# Patient Record
(demographics unavailable — no encounter records)

---

## 2024-12-18 NOTE — HEALTH RISK ASSESSMENT
[0] : 2) Feeling down, depressed, or hopeless: Not at all (0) [PHQ-2 Negative - No further assessment needed] : PHQ-2 Negative - No further assessment needed [Patient reported mammogram was normal] : Patient reported mammogram was normal [Patient reported PAP Smear was normal] : Patient reported PAP Smear was normal [Patient reported bone density results were normal] : Patient reported bone density results were normal [Patient reported colonoscopy was normal] : Patient reported colonoscopy was normal [Good] : ~his/her~  mood as  good [No] : In the past 12 months have you used drugs other than those required for medical reasons? No [No falls in past year] : Patient reported no falls in the past year [Fully functional (bathing, dressing, toileting, transferring, walking, feeding)] : Fully functional (bathing, dressing, toileting, transferring, walking, feeding) [Fully functional (using the telephone, shopping, preparing meals, housekeeping, doing laundry, using] : Fully functional and needs no help or supervision to perform IADLs (using the telephone, shopping, preparing meals, housekeeping, doing laundry, using transportation, managing medications and managing finances) [Current] : Current [20 or more] : 20 or more [BOG8Apatv] : 0 [No Retinopathy] : No retinopathy [EyeExamDate] : 2024 [Change in mental status noted] : No change in mental status noted [Reports changes in hearing] : Reports no changes in hearing [Reports changes in vision] : Reports no changes in vision [MammogramDate] : 2024 [PapSmearDate] : 2024 [BoneDensityDate] : 2024 [ColonoscopyDate] : 2024

## 2024-12-18 NOTE — HEALTH RISK ASSESSMENT
[0] : 2) Feeling down, depressed, or hopeless: Not at all (0) [PHQ-2 Negative - No further assessment needed] : PHQ-2 Negative - No further assessment needed [Patient reported mammogram was normal] : Patient reported mammogram was normal [Patient reported PAP Smear was normal] : Patient reported PAP Smear was normal [Patient reported bone density results were normal] : Patient reported bone density results were normal [Patient reported colonoscopy was normal] : Patient reported colonoscopy was normal [Good] : ~his/her~  mood as  good [No] : In the past 12 months have you used drugs other than those required for medical reasons? No [No falls in past year] : Patient reported no falls in the past year [Fully functional (bathing, dressing, toileting, transferring, walking, feeding)] : Fully functional (bathing, dressing, toileting, transferring, walking, feeding) [Fully functional (using the telephone, shopping, preparing meals, housekeeping, doing laundry, using] : Fully functional and needs no help or supervision to perform IADLs (using the telephone, shopping, preparing meals, housekeeping, doing laundry, using transportation, managing medications and managing finances) [Current] : Current [20 or more] : 20 or more [ENE4Byqiq] : 0 [No Retinopathy] : No retinopathy [EyeExamDate] : 2024 [Change in mental status noted] : No change in mental status noted [Reports changes in hearing] : Reports no changes in hearing [Reports changes in vision] : Reports no changes in vision [MammogramDate] : 2024 [PapSmearDate] : 2024 [BoneDensityDate] : 2024 [ColonoscopyDate] : 2024

## 2024-12-18 NOTE — ASSESSMENT
[FreeTextEntry1] : //  advise to sign medical release to have all records faxed over to review  Abnormal ecg, no chest pain or SOBE.  No prior EKG for comparison does not recall seeing cardiologist in the past -cardio referral   Diabetes Mellitus, wearing freestyle marilyn, no BG to review as just placed today  -continue metformin -Gave patient education on what foods to eat and avoid, as well as exercises to incorporate into their daily routine. -Encourage healthy Mediterranean Diet and moderate exercise regularly -May visit ADA (American Diabetes Association) website for diet recommendations -Advised to check a fasting sugar level in the morning, 2 hours after ANY meal and prior to bedtime.  -Record levels and bring it to your next appointment along with glucometer to review When to call your Doctor: -Call your doctor if you have unexplained blood sugar of 200 mg/dl or greater for 2 days.  -Call your doctor, if your blood sugar is higher than 300 mg/dl at any time. -Call your doctor, if your blood sugar is less than 70 mg/dl two times on the same day or three times within one week. Your doctor may want to adjust your medication.  -If you have signs/symptoms of low blood sugar (hypoglycemia),and/or your blood sugar is less than 70 mg/dl you may choose one of the below treatment 3 glucose tablets or  glass (4 oz.) of apple juice or 1/2 glass (4 oz.) of clear regular soda  -Advised to follow-up with Ophthalmologist annually for diabetic retinopathy screening   Hyperthyroidism -check levels  -continue methimazole for now  -endo referral   Hypertension, labile  -continue amlodipine as directed -educated that increased blood pressure is linked to but limited to increase risk of heart disease, stroke, kidney failure and even death. stressed the importance to lower values by complying to a less than 2g sodium diet, moderate cardiovascular exercise and decrease stress level as recommended by the SPRINT Trial -advised to check blood pressure at home with blood pressure cuff at different times of the day and bring to next appt, to eliminate possibility of white coat syndrome.  Nicotine Dependence, 1PPD >20 years, less now  -Have CT chest results faxed over to review.  Did discuss yearly LDCT for lung cancer screening -discussed multiple options to quit smoking such as nicotine replacements, medications and group therapy, advised to pick a quit date and strive to achieve complete cessation, informational phone numbers, programs and educational materiel given to patient, stressed importance to quit as to reduce risk of cancer, cardiovascular event and premature death.   -Patient is a smoker, smoking cessation was strongly encouraged.  Patient was advised that smoking cessation lowers risks for lung and other types of cancer, reduces the risk of coronary heart disease, stroke and peripheral vascular disease and reduces the risk of developing chronic obstructive pulmonary disease (COPD).  Overall, smoking can cause serious health issues and death.   -It is important that you stop smoking.  Advised to pick a quit date and adhere to therapy.  If you need help to quit smoking call the [facility] Smokers Quitline at 1-972.245.1720 for additional information and nicotine replacement therapy   time spent on education 5 minutes.  Obesity  -Being overweight increases your risk of health conditions such as heart disease, high blood pressure, type 2 diabetes, and certain types of cancer. It can also increase your risk for osteoarthritis, sleep apnea, and other respiratory problems. Aim for a slow, steady weight loss. Even a small amount of weight loss can lower your risk of health problems. -A safe and healthy way to lose weight is to eat fewer calories and get regular exercise. You can lose up about 1 pound a week by decreasing the number of calories you eat by 500 calories each day. You can decrease calories by eating smaller portion sizes or by cutting out high-calorie foods. Read labels to find out how many calories are in the foods you eat. You can also burn calories with exercise such as walking, swimming, or biking. You will be more likely to keep weight off if you make these changes part of your lifestyle. -Exercise at least 30 minutes per day on most days of the week. Some examples of exercise include walking, biking, dancing, and swimming. You can also fit in more physical activity by taking the stairs instead of the elevator or parking farther away from stores.  Healthcare Maintenance -Advise Yearly Skin cancer screening with Dermatologist  -Advise Yearly Eye exam with Ophthalmologist -Advise Yearly Dental exam -Educated of the importance of Healthy diet, such as Mediterranean Diet and Exercise, such as walking >20 minutes a day and increasing gradually as tolerated  Immunizations -Flu vaccine -up to date  -Covid vaccine  -Pneumonia vaccine -given today  -Discussed shingles vaccine (shingrix), advised to verify with insurance if its covered through medical or prescription plan  Preventative screening  -advised to get PAP for cervical cancer screening -up to date  -advised to get mammogram for breast cancer screening  -up to date  -advised to get bone density for osteoporosis screening -up to date  -advised to get colonoscopy for colon cancer screening -up to date   f/u in 2 months, sooner if needed

## 2024-12-18 NOTE — HISTORY OF PRESENT ILLNESS
[de-identified] : 65 year old female with history of diabetes, hypertension, obesity, hyperthyroidism, presents for initial annual exam.  Recently moved to the area and establishing care.  Was following up with Dr. Betancourt in [place].  States no longer wants to see her as she is the one who put her on the losartan.   Recently discharged from hospital last month due to SONALI on following initiation of losartan.   Compliant with meds, no side effects.   History of hyperthyroidism.  Has not followed up with endocrinologist since hospitalization. +smoker, 1-3 cigs per day, Does recall seeing specialist, Dr. Rees? and getting CT chest earlier this year.  Single, not sexually active lives at home alone

## 2024-12-18 NOTE — ASSESSMENT
[FreeTextEntry1] : //  advise to sign medical release to have all records faxed over to review  Abnormal ecg, no chest pain or SOBE.  No prior EKG for comparison does not recall seeing cardiologist in the past -cardio referral   Diabetes Mellitus, wearing freestyle marilyn, no BG to review as just placed today  -continue metformin -Gave patient education on what foods to eat and avoid, as well as exercises to incorporate into their daily routine. -Encourage healthy Mediterranean Diet and moderate exercise regularly -May visit ADA (American Diabetes Association) website for diet recommendations -Advised to check a fasting sugar level in the morning, 2 hours after ANY meal and prior to bedtime.  -Record levels and bring it to your next appointment along with glucometer to review When to call your Doctor: -Call your doctor if you have unexplained blood sugar of 200 mg/dl or greater for 2 days.  -Call your doctor, if your blood sugar is higher than 300 mg/dl at any time. -Call your doctor, if your blood sugar is less than 70 mg/dl two times on the same day or three times within one week. Your doctor may want to adjust your medication.  -If you have signs/symptoms of low blood sugar (hypoglycemia),and/or your blood sugar is less than 70 mg/dl you may choose one of the below treatment 3 glucose tablets or  glass (4 oz.) of apple juice or 1/2 glass (4 oz.) of clear regular soda  -Advised to follow-up with Ophthalmologist annually for diabetic retinopathy screening   Hyperthyroidism -check levels  -continue methimazole for now  -endo referral   Hypertension, labile  -continue amlodipine as directed -educated that increased blood pressure is linked to but limited to increase risk of heart disease, stroke, kidney failure and even death. stressed the importance to lower values by complying to a less than 2g sodium diet, moderate cardiovascular exercise and decrease stress level as recommended by the SPRINT Trial -advised to check blood pressure at home with blood pressure cuff at different times of the day and bring to next appt, to eliminate possibility of white coat syndrome.  Nicotine Dependence, 1PPD >20 years, less now  -Have CT chest results faxed over to review.  Did discuss yearly LDCT for lung cancer screening -discussed multiple options to quit smoking such as nicotine replacements, medications and group therapy, advised to pick a quit date and strive to achieve complete cessation, informational phone numbers, programs and educational materiel given to patient, stressed importance to quit as to reduce risk of cancer, cardiovascular event and premature death.   -Patient is a smoker, smoking cessation was strongly encouraged.  Patient was advised that smoking cessation lowers risks for lung and other types of cancer, reduces the risk of coronary heart disease, stroke and peripheral vascular disease and reduces the risk of developing chronic obstructive pulmonary disease (COPD).  Overall, smoking can cause serious health issues and death.   -It is important that you stop smoking.  Advised to pick a quit date and adhere to therapy.  If you need help to quit smoking call the [facility] Smokers Quitline at 1-820.871.8016 for additional information and nicotine replacement therapy   time spent on education 5 minutes.  Obesity  -Being overweight increases your risk of health conditions such as heart disease, high blood pressure, type 2 diabetes, and certain types of cancer. It can also increase your risk for osteoarthritis, sleep apnea, and other respiratory problems. Aim for a slow, steady weight loss. Even a small amount of weight loss can lower your risk of health problems. -A safe and healthy way to lose weight is to eat fewer calories and get regular exercise. You can lose up about 1 pound a week by decreasing the number of calories you eat by 500 calories each day. You can decrease calories by eating smaller portion sizes or by cutting out high-calorie foods. Read labels to find out how many calories are in the foods you eat. You can also burn calories with exercise such as walking, swimming, or biking. You will be more likely to keep weight off if you make these changes part of your lifestyle. -Exercise at least 30 minutes per day on most days of the week. Some examples of exercise include walking, biking, dancing, and swimming. You can also fit in more physical activity by taking the stairs instead of the elevator or parking farther away from stores.  Healthcare Maintenance -Advise Yearly Skin cancer screening with Dermatologist  -Advise Yearly Eye exam with Ophthalmologist -Advise Yearly Dental exam -Educated of the importance of Healthy diet, such as Mediterranean Diet and Exercise, such as walking >20 minutes a day and increasing gradually as tolerated  Immunizations -Flu vaccine -up to date  -Covid vaccine  -Pneumonia vaccine -given today  -Discussed shingles vaccine (shingrix), advised to verify with insurance if its covered through medical or prescription plan  Preventative screening  -advised to get PAP for cervical cancer screening -up to date  -advised to get mammogram for breast cancer screening  -up to date  -advised to get bone density for osteoporosis screening -up to date  -advised to get colonoscopy for colon cancer screening -up to date   f/u in 2 months, sooner if needed

## 2025-03-17 NOTE — ASSESSMENT
[FreeTextEntry1] : //  advise to sign medical release to have all records faxed over to review- still never recevied   Abnormal ecg, no chest pain or SOBE.  No prior EKG for comparison does not recall seeing cardiologist in the past -cardio referral -still needs to see  Diabetes Mellitus, controlled  -continue metformin -Gave patient education on what foods to eat and avoid, as well as exercises to incorporate into their daily routine. -Encourage healthy Mediterranean Diet and moderate exercise regularly -May visit ADA (American Diabetes Association) website for diet recommendations -Advised to check a fasting sugar level in the morning, 2 hours after ANY meal and prior to bedtime.  -Record levels and bring it to your next appointment along with glucometer to review When to call your Doctor: -Call your doctor if you have unexplained blood sugar of 200 mg/dl or greater for 2 days.  -Call your doctor, if your blood sugar is higher than 300 mg/dl at any time. -Call your doctor, if your blood sugar is less than 70 mg/dl two times on the same day or three times within one week. Your doctor may want to adjust your medication.  -If you have signs/symptoms of low blood sugar (hypoglycemia),and/or your blood sugar is less than 70 mg/dl you may choose one of the below treatment 3 glucose tablets or  glass (4 oz.) of apple juice or 1/2 glass (4 oz.) of clear regular soda  -Advised to follow-up with Ophthalmologist annually for diabetic retinopathy screening   Hyperthyroidism -check levels  -continue methimazole for now  -endo referral -still needs to see   Hypertension, labile  -continue amlodipine as directed -educated that increased blood pressure is linked to but limited to increase risk of heart disease, stroke, kidney failure and even death. stressed the importance to lower values by complying to a less than 2g sodium diet, moderate cardiovascular exercise and decrease stress level as recommended by the SPRINT Trial -advised to check blood pressure at home with blood pressure cuff at different times of the day and bring to next appt, to eliminate possibility of white coat syndrome.  Nicotine Dependence, 1PPD >20 years, quit smoking in 11/2024. -Have CT chest results faxed over to review.  Did discuss yearly LDCT for lung cancer screening -discussed multiple options to quit smoking such as nicotine replacements, medications and group therapy, advised to pick a quit date and strive to achieve complete cessation, informational phone numbers, programs and educational materiel given to patient, stressed importance to quit as to reduce risk of cancer, cardiovascular event and premature death.   -Patient is a smoker, smoking cessation was strongly encouraged.  Patient was advised that smoking cessation lowers risks for lung and other types of cancer, reduces the risk of coronary heart disease, stroke and peripheral vascular disease and reduces the risk of developing chronic obstructive pulmonary disease (COPD).  Overall, smoking can cause serious health issues and death.   -It is important that you stop smoking.  Advised to pick a quit date and adhere to therapy.  If you need help to quit smoking call the [facility] Smokers Quitline at 1-896.140.6410 for additional information and nicotine replacement therapy   time spent on education 5 minutes.  Obesity  -Being overweight increases your risk of health conditions such as heart disease, high blood pressure, type 2 diabetes, and certain types of cancer. It can also increase your risk for osteoarthritis, sleep apnea, and other respiratory problems. Aim for a slow, steady weight loss. Even a small amount of weight loss can lower your risk of health problems. -A safe and healthy way to lose weight is to eat fewer calories and get regular exercise. You can lose up about 1 pound a week by decreasing the number of calories you eat by 500 calories each day. You can decrease calories by eating smaller portion sizes or by cutting out high-calorie foods. Read labels to find out how many calories are in the foods you eat. You can also burn calories with exercise such as walking, swimming, or biking. You will be more likely to keep weight off if you make these changes part of your lifestyle. -Exercise at least 30 minutes per day on most days of the week. Some examples of exercise include walking, biking, dancing, and swimming. You can also fit in more physical activity by taking the stairs instead of the elevator or parking farther away from stores.  Immunizations -Flu vaccine -up to date  -Covid vaccine  -Pneumonia vaccine -up to date  -Discussed shingles vaccine (shingrix), advised to verify with insurance if its covered through medical or prescription plan  Preventative screening  -advised to get PAP for cervical cancer screening -up to date  -advised to get mammogram for breast cancer screening  -up to date  -advised to get bone density for osteoporosis screening -up to date  -advised to get colonoscopy for colon cancer screening -up to date   f/u in 3 months, sooner if needed

## 2025-03-17 NOTE — HISTORY OF PRESENT ILLNESS
[de-identified] : 66 year old female with history of diabetes, hypertension, obesity, hyperthyroidism, presents for followup.    still needs to see endocrinologist for hyperthyroidism.   Was having car trouble in the middle of moving but states at more stable place and will reach out to schedule an appointment History of hyperthyroidism.  Has not followed up with endocrinologist since hospitalization. quit smoking in 11/2024.  Single, not sexually active lives at home alone  former smoker, quit smoking in 11/2024.  No.

## 2025-06-18 NOTE — ASSESSMENT
[FreeTextEntry1] : //  advise to sign medical release to have all records faxed over to review- still never recevied   Abnormal ecg, no chest pain or SOBE.  No prior EKG for comparison does not recall seeing cardiologist in the past -cardio referral -still needs to see  Diabetes Mellitus, controlled  -continue metformin -Gave patient education on what foods to eat and avoid, as well as exercises to incorporate into their daily routine. -Encourage healthy Mediterranean Diet and moderate exercise regularly -May visit ADA (American Diabetes Association) website for diet recommendations -Advised to check a fasting sugar level in the morning, 2 hours after ANY meal and prior to bedtime.  -Record levels and bring it to your next appointment along with glucometer to review When to call your Doctor: -Call your doctor if you have unexplained blood sugar of 200 mg/dl or greater for 2 days.  -Call your doctor, if your blood sugar is higher than 300 mg/dl at any time. -Call your doctor, if your blood sugar is less than 70 mg/dl two times on the same day or three times within one week. Your doctor may want to adjust your medication.  -If you have signs/symptoms of low blood sugar (hypoglycemia),and/or your blood sugar is less than 70 mg/dl you may choose one of the below treatment 3 glucose tablets or  glass (4 oz.) of apple juice or 1/2 glass (4 oz.) of clear regular soda  -Advised to follow-up with Ophthalmologist annually for diabetic retinopathy screening   Hyperthyroidism -check levels  -continue methimazole for now  -endo referral -still needs to see   Hypertension, labile  -continue amlodipine as directed -educated that increased blood pressure is linked to but limited to increase risk of heart disease, stroke, kidney failure and even death. stressed the importance to lower values by complying to a less than 2g sodium diet, moderate cardiovascular exercise and decrease stress level as recommended by the SPRINT Trial -advised to check blood pressure at home with blood pressure cuff at different times of the day and bring to next appt, to eliminate possibility of white coat syndrome.  Nicotine Dependence, 1PPD >20 years, quit smoking in 11/2024. -Have CT chest results faxed over to review.  Did discuss yearly LDCT for lung cancer screening -discussed multiple options to quit smoking such as nicotine replacements, medications and group therapy, advised to pick a quit date and strive to achieve complete cessation, informational phone numbers, programs and educational materiel given to patient, stressed importance to quit as to reduce risk of cancer, cardiovascular event and premature death.   -Patient is a smoker, smoking cessation was strongly encouraged.  Patient was advised that smoking cessation lowers risks for lung and other types of cancer, reduces the risk of coronary heart disease, stroke and peripheral vascular disease and reduces the risk of developing chronic obstructive pulmonary disease (COPD).  Overall, smoking can cause serious health issues and death.   -It is important that you stop smoking.  Advised to pick a quit date and adhere to therapy.  If you need help to quit smoking call the [facility] Smokers Quitline at 1-310.575.7037 for additional information and nicotine replacement therapy   time spent on education 5 minutes.  Obesity  -Being overweight increases your risk of health conditions such as heart disease, high blood pressure, type 2 diabetes, and certain types of cancer. It can also increase your risk for osteoarthritis, sleep apnea, and other respiratory problems. Aim for a slow, steady weight loss. Even a small amount of weight loss can lower your risk of health problems. -A safe and healthy way to lose weight is to eat fewer calories and get regular exercise. You can lose up about 1 pound a week by decreasing the number of calories you eat by 500 calories each day. You can decrease calories by eating smaller portion sizes or by cutting out high-calorie foods. Read labels to find out how many calories are in the foods you eat. You can also burn calories with exercise such as walking, swimming, or biking. You will be more likely to keep weight off if you make these changes part of your lifestyle. -Exercise at least 30 minutes per day on most days of the week. Some examples of exercise include walking, biking, dancing, and swimming. You can also fit in more physical activity by taking the stairs instead of the elevator or parking farther away from stores.  Immunizations -Flu vaccine -up to date  -Covid vaccine  -Pneumonia vaccine -up to date  -Discussed shingles vaccine (shingrix), advised to verify with insurance if its covered through medical or prescription plan  Preventative screening  -advised to get PAP for cervical cancer screening -up to date  -advised to get mammogram for breast cancer screening  -up to date  -advised to get bone density for osteoporosis screening -up to date  -advised to get colonoscopy for colon cancer screening -up to date   f/u in 3 months, sooner if needed